# Patient Record
Sex: MALE | Race: WHITE | Employment: FULL TIME | URBAN - METROPOLITAN AREA
[De-identification: names, ages, dates, MRNs, and addresses within clinical notes are randomized per-mention and may not be internally consistent; named-entity substitution may affect disease eponyms.]

---

## 2021-04-29 ENCOUNTER — IMMUNIZATION (OUTPATIENT)
Dept: LAB | Age: 56
End: 2021-04-29
Attending: HOSPITALIST
Payer: COMMERCIAL

## 2021-04-29 DIAGNOSIS — Z23 NEED FOR VACCINATION: Primary | ICD-10-CM

## 2021-04-29 PROCEDURE — 0001A SARSCOV2 VAC 30MCG/0.3ML IM: CPT

## 2021-05-20 ENCOUNTER — IMMUNIZATION (OUTPATIENT)
Dept: LAB | Age: 56
End: 2021-05-20
Attending: HOSPITALIST
Payer: COMMERCIAL

## 2021-05-20 DIAGNOSIS — Z23 NEED FOR VACCINATION: Primary | ICD-10-CM

## 2021-05-20 PROCEDURE — 0002A SARSCOV2 VAC 30MCG/0.3ML IM: CPT

## 2022-01-26 ENCOUNTER — IMMUNIZATION (OUTPATIENT)
Dept: LAB | Facility: HOSPITAL | Age: 57
End: 2022-01-26
Attending: EMERGENCY MEDICINE
Payer: COMMERCIAL

## 2022-01-26 DIAGNOSIS — Z23 NEED FOR VACCINATION: Primary | ICD-10-CM

## 2022-01-26 PROCEDURE — 0004A SARSCOV2 VAC 30MCG/0.3ML IM: CPT | Performed by: NURSE PRACTITIONER

## 2024-09-09 ENCOUNTER — OFFICE VISIT (OUTPATIENT)
Dept: SURGERY | Facility: CLINIC | Age: 59
End: 2024-09-09
Payer: COMMERCIAL

## 2024-09-09 DIAGNOSIS — N52.9 ERECTILE DYSFUNCTION, UNSPECIFIED ERECTILE DYSFUNCTION TYPE: Primary | ICD-10-CM

## 2024-09-09 DIAGNOSIS — R82.90 URINE FINDING: ICD-10-CM

## 2024-09-09 LAB
APPEARANCE: CLEAR
BILIRUBIN: NEGATIVE
GLUCOSE (URINE DIPSTICK): NEGATIVE MG/DL
KETONES (URINE DIPSTICK): NEGATIVE MG/DL
LEUKOCYTES: NEGATIVE
MULTISTIX LOT#: NORMAL NUMERIC
NITRITE, URINE: NEGATIVE
OCCULT BLOOD: NEGATIVE
PH, URINE: 7 (ref 4.5–8)
PROTEIN (URINE DIPSTICK): NEGATIVE MG/DL
SPECIFIC GRAVITY: 1.01 (ref 1–1.03)
URINE-COLOR: YELLOW
UROBILINOGEN,SEMI-QN: 0.2 MG/DL (ref 0–1.9)

## 2024-09-09 PROCEDURE — 99203 OFFICE O/P NEW LOW 30 MIN: CPT

## 2024-09-09 PROCEDURE — 81003 URINALYSIS AUTO W/O SCOPE: CPT

## 2024-09-09 RX ORDER — TADALAFIL 20 MG/1
20 TABLET ORAL
Qty: 30 TABLET | Refills: 5 | Status: SHIPPED | OUTPATIENT
Start: 2024-09-09

## 2024-09-09 NOTE — PROGRESS NOTES
[facility], [facility]    Urology Consult Note    History of Present Illness:   Patient is a(n) 59 year old male who presents for ED.     Wife passed 6mos ago from triple neg breast cx. Initially dx in 2020. Has been seeing therapist and psychiatrist since 2020. Was given xanax in the day and ambien to sleep at night in the beginning. Did not like ambien.      Most recently, started using edibles to help with sleep almost daily for 3mos. Noticed erections affected and stopped 3 mos ago.     Was then seen by [facility] who prescribed cialis 8.5mg-prozac 20mg combo pill. Started getting more morning erections but sensation was dulled during intercourse. Stopped this last wk.     Interested to discuss other options for ED. No urinary complaints.     Annual labs normal. Not taking any meds. In good health and exercises regularly. Starting to date and has a new partner.     HISTORY:  No past medical history on file.   No past surgical history on file.   No family history on file.   Social History:   Social History     Socioeconomic History    Marital status:         Allergies  Not on File    Review of Systems:   A 10-point review of systems was completed and is negative other than as noted above.    Physical Exam:   There were no vitals taken for this visit.    GENERAL APPEARANCE: no acute distress  NEUROLOGIC: converses appropriately  HEAD: atraumatic, normocephalic  LUNGS: non-labored breathing  ABDOMEN: soft, nontender, non-distended  BACK: no CVA tenderness  PSYCH: appropriate affect and mood    Results:     Laboratory Data:  No results found for: \"WBC\", \"HGB\", \"PLT\"  No results found for: \"NA\", \"K\", \"CL\", \"CO2\", \"BUN\", \"CREATININE\", \"GLU\", \"GFRAA\", \"AST\", \"ALT\", \"TP\", \"ALB\", \"PHOS\", \"CA\", \"MG\"    Urinalysis Results (last 3 years):  Recent Labs     09/09/24  0950   SPECGRAVITY 1.010   PHURINE 7.0   NITRITE Negative       Urine Culture Results (last 3 years):  No results  found for: \"URINECUL\"    Imaging  No results found.      Impression:   Recommendations:  ED  - discussed nature of sexual dysfunction with pt and reviewed cause of ED can be multifactorial including vascular, diabetes, endocrine, psychologic, medication, and iatrogenic causes. Therefore, pt should maintain healthy BP, cholesterol, and blood sugars.  - encouraged to exercise as able and promoted healthy diet.  - dicussed possibility of oral medication, vacuum erectile device, intracavernosal injections, and inflatable penile prosthesis.  - will try cialis 20mg; begin at 10mg and if no results, increase to 20mg  - he will let me know in a mo if effective or not; can switch to viagra at that time  - briefly discussed daily low dose cialis vs. trimix if above therapies fail   - can consider low T labs in future as well pending on sx    Thank you very much for this consult. Please call if there are any questions or concerns.     Alexandra Mcmillan PA-C  Urology  [facility]  Phone: [phone]    Date: 9/9/2024  Time: 10:13 AM

## 2024-10-29 RX ORDER — TADALAFIL 20 MG/1
20 TABLET ORAL
Qty: 30 TABLET | Refills: 5 | Status: SHIPPED | OUTPATIENT
Start: 2024-10-29